# Patient Record
Sex: FEMALE | Race: AMERICAN INDIAN OR ALASKA NATIVE | NOT HISPANIC OR LATINO | Employment: FULL TIME | URBAN - NONMETROPOLITAN AREA
[De-identification: names, ages, dates, MRNs, and addresses within clinical notes are randomized per-mention and may not be internally consistent; named-entity substitution may affect disease eponyms.]

---
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
## 2025-03-01 ENCOUNTER — NURSE TRIAGE (OUTPATIENT)
Dept: NURSING | Facility: CLINIC | Age: 20
End: 2025-03-01
Payer: MEDICAID

## 2025-03-01 NOTE — TELEPHONE ENCOUNTER
Nurse Triage SBAR    Is this a 2nd Level Triage? NO    Situation: Patient calling.     Background: Vaginal discharge.     Assessment: Pt having vaginal itching and burning.  Vaginal discharge, white or yellow. Symptoms began last week. No fever.     Protocol Recommended Disposition:   See PCP Within 3 Days    Recommendation: See pcp in clinic within three days. UC if no appt available.  Pt agreeable.           Does the patient meet one of the following criteria for ADS visit consideration? No    Reason for Disposition   Bad smelling vaginal discharge    Additional Information   Negative: Sounds like a life-threatening emergency to the triager   Negative: [1] Vaginal or pelvic pain AND [2] severe   Negative: [1] Mpox (monkeypox) rash suspected (unexplained rash often starting on the face or genital area, then spreading quickly to the arms and legs) AND [2] known Mpox exposure in last 21 days (Note: exposure means close contact with person who has a confirmed diagnosis of monkeypox)   Negative: Child sounds very sick or weak to the triager   Negative: [1] Vaginal or pelvic pain is constant AND [2] present > 2 hours   Negative: [1] Genital area looks infected AND [2] fever   Negative: [1] Genital area looks infected AND [2] large red area (> 2 in. or 5 cm)   Negative: [1] Yellow or green vaginal discharge AND [2] fever   Negative: [1] Can't pass urine AND [2] bladder feels very full   Negative: [1] Widespread red rash AND [2] vaginal discharge   Negative: Blood in vaginal discharge   (Exception: normal, regular menstrual period)   Negative: [1] SEVERE abdominal pain (e.g., excruciating) AND [2] present > 1 hour   Negative: Patient sounds very sick or weak to the triager   Negative: [1] Yellow or green vaginal discharge AND [2] fever   Negative: [1] Genital area looks infected (e.g., draining sore, spreading redness) AND [2] fever   Negative: [1] Constant abdominal pain AND [2] present > 2 hours   Negative: [1] Mild  lower abdominal pain comes and goes (cramps) AND [2] lasts > 24 hours   Negative: Genital area looks infected (e.g., draining sore, spreading redness)   Negative: [1] Rash is tiny water blisters AND [2] 3 or more   Negative: [1] Rash (e.g., redness, tiny bumps, sore) of genital area AND [2] present > 24 hours    Protocols used: Vaginal [id number], Vaginal Symptoms or Discharge - After [id number]

## (undated) RX ORDER — ACETAMINOPHEN 325 MG/1
TABLET ORAL
Status: DISPENSED
Start: 2024-10-22

## (undated) RX ORDER — FERROUS SULFATE 325(65) MG
TABLET, DELAYED RELEASE (ENTERIC COATED) ORAL
Status: DISPENSED
Start: 2022-02-17